# Patient Record
Sex: FEMALE | Race: WHITE | Employment: FULL TIME | URBAN - METROPOLITAN AREA
[De-identification: names, ages, dates, MRNs, and addresses within clinical notes are randomized per-mention and may not be internally consistent; named-entity substitution may affect disease eponyms.]

---

## 2017-03-31 ENCOUNTER — TELEPHONE (OUTPATIENT)
Dept: INTERNAL MEDICINE CLINIC | Age: 27
End: 2017-03-31

## 2017-03-31 DIAGNOSIS — Z76.0 MEDICATION REFILL: ICD-10-CM

## 2017-03-31 RX ORDER — DROSPIRENONE AND ETHINYL ESTRADIOL 0.02-3(28)
KIT ORAL
Qty: 84 TAB | Refills: 0 | OUTPATIENT
Start: 2017-03-31

## 2017-03-31 NOTE — TELEPHONE ENCOUNTER
If she has an ob/gyn, she needs to get from them.     Denied:  '  Requested Prescriptions     Refused Prescriptions Disp Refills    VESTURA, 26, 3-0.02 mg tab [Pharmacy Med Name: Jessie Sat 3MG/0.02MG TABLETS 28'S] 84 Tab 0     Sig: TAKE 1 TABLET BY MOUTH DAILY     Refused By: Yoandy Johnson     Reason for Refusal: other

## 2017-04-13 NOTE — TELEPHONE ENCOUNTER
Attempted to reach patient regarding below. Unable to leave vm.  Will continue to try to contact patient

## 2017-04-14 NOTE — TELEPHONE ENCOUNTER
Attempted to contact pt at  number, no answer. Mailbox is full and cannot accept any messages at this time. Will continue to try to contact pt.

## 2017-04-14 NOTE — TELEPHONE ENCOUNTER
Attempted to contact patient on several occasions no answers the following encounter will be closed. A letter will be sent.